# Patient Record
Sex: FEMALE | Race: WHITE | Employment: OTHER | URBAN - METROPOLITAN AREA
[De-identification: names, ages, dates, MRNs, and addresses within clinical notes are randomized per-mention and may not be internally consistent; named-entity substitution may affect disease eponyms.]

---

## 2017-05-07 ENCOUNTER — APPOINTMENT (OUTPATIENT)
Dept: MRI IMAGING | Facility: HOSPITAL | Age: 57
End: 2017-05-07
Attending: EMERGENCY MEDICINE
Payer: COMMERCIAL

## 2017-05-07 ENCOUNTER — HOSPITAL ENCOUNTER (EMERGENCY)
Facility: HOSPITAL | Age: 57
Discharge: HOME | End: 2017-05-07
Attending: EMERGENCY MEDICINE
Payer: COMMERCIAL

## 2017-05-07 ENCOUNTER — APPOINTMENT (OUTPATIENT)
Dept: CT IMAGING | Facility: HOSPITAL | Age: 57
End: 2017-05-07
Attending: EMERGENCY MEDICINE
Payer: COMMERCIAL

## 2017-05-07 VITALS
TEMPERATURE: 98 F | SYSTOLIC BLOOD PRESSURE: 133 MMHG | HEART RATE: 64 BPM | OXYGEN SATURATION: 98 % | WEIGHT: 120 LBS | RESPIRATION RATE: 16 BRPM | HEIGHT: 64 IN | BODY MASS INDEX: 20.49 KG/M2 | DIASTOLIC BLOOD PRESSURE: 68 MMHG

## 2017-05-07 DIAGNOSIS — A87.9 VIRAL MENINGITIS: ICD-10-CM

## 2017-05-07 DIAGNOSIS — G44.209 TENSION HEADACHE: ICD-10-CM

## 2017-05-07 DIAGNOSIS — I67.1 ANEURYSM OF RIGHT INTERNAL CAROTID ARTERY: Primary | ICD-10-CM

## 2017-05-07 PROCEDURE — 96361 HYDRATE IV INFUSION ADD-ON: CPT

## 2017-05-07 PROCEDURE — 89050 BODY FLUID CELL COUNT: CPT | Performed by: EMERGENCY MEDICINE

## 2017-05-07 PROCEDURE — 80048 BASIC METABOLIC PNL TOTAL CA: CPT | Performed by: EMERGENCY MEDICINE

## 2017-05-07 PROCEDURE — 96376 TX/PRO/DX INJ SAME DRUG ADON: CPT

## 2017-05-07 PROCEDURE — 99285 EMERGENCY DEPT VISIT HI MDM: CPT

## 2017-05-07 PROCEDURE — 70553 MRI BRAIN STEM W/O & W/DYE: CPT | Performed by: EMERGENCY MEDICINE

## 2017-05-07 PROCEDURE — 88160 CYTOPATH SMEAR OTHER SOURCE: CPT | Performed by: EMERGENCY MEDICINE

## 2017-05-07 PROCEDURE — 96375 TX/PRO/DX INJ NEW DRUG ADDON: CPT

## 2017-05-07 PROCEDURE — A9575 INJ GADOTERATE MEGLUMI 0.1ML: HCPCS | Performed by: EMERGENCY MEDICINE

## 2017-05-07 PROCEDURE — 96374 THER/PROPH/DIAG INJ IV PUSH: CPT

## 2017-05-07 PROCEDURE — 82945 GLUCOSE OTHER FLUID: CPT | Performed by: EMERGENCY MEDICINE

## 2017-05-07 PROCEDURE — 87205 SMEAR GRAM STAIN: CPT | Performed by: EMERGENCY MEDICINE

## 2017-05-07 PROCEDURE — 85025 COMPLETE CBC W/AUTO DIFF WBC: CPT | Performed by: EMERGENCY MEDICINE

## 2017-05-07 PROCEDURE — 89051 BODY FLUID CELL COUNT: CPT | Performed by: EMERGENCY MEDICINE

## 2017-05-07 PROCEDURE — 84157 ASSAY OF PROTEIN OTHER: CPT | Performed by: EMERGENCY MEDICINE

## 2017-05-07 PROCEDURE — 62270 DX LMBR SPI PNXR: CPT

## 2017-05-07 PROCEDURE — 70450 CT HEAD/BRAIN W/O DYE: CPT | Performed by: EMERGENCY MEDICINE

## 2017-05-07 PROCEDURE — 70544 MR ANGIOGRAPHY HEAD W/O DYE: CPT | Performed by: EMERGENCY MEDICINE

## 2017-05-07 PROCEDURE — 87070 CULTURE OTHR SPECIMN AEROBIC: CPT | Performed by: EMERGENCY MEDICINE

## 2017-05-07 RX ORDER — LORAZEPAM 2 MG/ML
0.5 INJECTION INTRAMUSCULAR ONCE
Status: COMPLETED | OUTPATIENT
Start: 2017-05-07 | End: 2017-05-07

## 2017-05-07 RX ORDER — TRAMADOL HYDROCHLORIDE 50 MG/1
50 TABLET ORAL EVERY 4 HOURS PRN
Qty: 14 TABLET | Refills: 0 | Status: SHIPPED | OUTPATIENT
Start: 2017-05-07 | End: 2017-05-14

## 2017-05-07 RX ORDER — HYDROMORPHONE HYDROCHLORIDE 1 MG/ML
0.5 INJECTION, SOLUTION INTRAMUSCULAR; INTRAVENOUS; SUBCUTANEOUS ONCE
Status: COMPLETED | OUTPATIENT
Start: 2017-05-07 | End: 2017-05-07

## 2017-05-07 NOTE — ED PROVIDER NOTES
Patient Seen in: [facility] Emergency Department    History   Patient presents with:  Headache    Stated Complaint: headache    HPI  Pt c/o a 5/10 headache that began last night. Headache began gradually.   This is more intense when compared to pr Neuro: Oriented x3. PERRL, EOMI. CN II - XII grossly intact. No gross motor deficits. 5/5 strength in all distribution. Sensation fully intact.       DDX to include tension headache vs. Migraine headache vs. Sinusitis vs. [facility]        ED Course     Lab controlled    Lumbar puncture:    After verbal informed consent from patient explaining the risks including infection, bleeding, and neurologic damage, a lumbar puncture was performed after the patient was prepped and draped in the usual fashion.   The back

## 2017-05-07 NOTE — ED INITIAL ASSESSMENT (HPI)
Pt c/o joint pain, headache since yesterday. Chills. Pain to head is generalized that radiates down her neck.  history viral meningitis in the past

## 2017-05-07 NOTE — ED PROVIDER NOTES
Patient Seen in: [facility] Emergency Department    History   Patient presents with:  Headache    Stated Complaint: headache    HPI    Patient signed off to me by Dr. Korey Perez pending CSF results.     Past Medical History   Diagnosis Date   • Nela Salazar Total Protein CSF 68 (*)     All other components within normal limits   CELLCOUNT/DIFF CSF - Abnormal; Notable for the following:     Lymphocytes CSF 95 (*)     Monocytes CSF 5 (*)     All other components within normal limits   CBC W/ DIFFERENTIAL - R-0

## 2017-05-23 ENCOUNTER — OFFICE VISIT (OUTPATIENT)
Dept: NEUROLOGY | Facility: CLINIC | Age: 57
End: 2017-05-23

## 2017-05-23 ENCOUNTER — TELEPHONE (OUTPATIENT)
Dept: NEUROLOGY | Facility: CLINIC | Age: 57
End: 2017-05-23

## 2017-05-23 VITALS
HEIGHT: 64 IN | RESPIRATION RATE: 18 BRPM | SYSTOLIC BLOOD PRESSURE: 122 MMHG | DIASTOLIC BLOOD PRESSURE: 76 MMHG | BODY MASS INDEX: 20.83 KG/M2 | HEART RATE: 76 BPM | WEIGHT: 122 LBS | OXYGEN SATURATION: 95 %

## 2017-05-23 DIAGNOSIS — A87.9 VIRAL MENINGITIS: ICD-10-CM

## 2017-05-23 DIAGNOSIS — I67.1 CEREBRAL ANEURYSM: Primary | ICD-10-CM

## 2017-05-23 PROBLEM — R51.9 HEADACHE: Status: ACTIVE | Noted: 2017-05-23

## 2017-05-23 PROCEDURE — 99204 OFFICE O/P NEW MOD 45 MIN: CPT | Performed by: OTHER

## 2017-05-23 NOTE — TELEPHONE ENCOUNTER
Spoke to [facility]. States will run extra tests ordered now. Unsure if quantity of sample is enough until attempt to run tests. Test 3 [id number] and [id number]. Order placed.

## 2017-05-23 NOTE — TELEPHONE ENCOUNTER
Can we please contact lab and see if we can add additional tests to CSF from 5/7 (would add on VZV, HSV)

## 2017-05-23 NOTE — PROGRESS NOTES
Neurology Outpatient Initial Note    Ronna Downey : 1960   HPI:     Ronna Downey is a 59year old female who is being seen in neurologic evaluation. Patient is being seen in evaluation for bout of meningitis, possible aneurysm.     On Saturday,  internal carotid artery at the level of the cavernous carotid artery. Recommend correlation with any previous outside studies. Consider followup CT angiography or catheter study   for further assessment if clinically important.   2. Otherwise, no aneurysms nuchal rigidity  Neuro:  Mental Status: alert, speech fluent and appropriate       Cranial Nerves: pupils equal, round, and reactive to light; extraocular movements intact; facial sensation intact V1-3, face symmetric, hearing intact, no dysarthria or dysp

## 2017-05-24 NOTE — TELEPHONE ENCOUNTER
Spoke to [facility]. Specimen has not been frozen and is over 3weeks old. Not able to run new tests ordered.

## 2018-01-06 ENCOUNTER — HOSPITAL ENCOUNTER (OUTPATIENT)
Age: 58
Discharge: HOME | End: 2018-01-06
Attending: EMERGENCY MEDICINE
Payer: COMMERCIAL

## 2018-01-06 VITALS
WEIGHT: 125 LBS | HEART RATE: 62 BPM | SYSTOLIC BLOOD PRESSURE: 153 MMHG | HEIGHT: 64 IN | OXYGEN SATURATION: 98 % | TEMPERATURE: 98 F | RESPIRATION RATE: 16 BRPM | BODY MASS INDEX: 21.34 KG/M2 | DIASTOLIC BLOOD PRESSURE: 85 MMHG

## 2018-01-06 DIAGNOSIS — H00.11 CHALAZION OF RIGHT UPPER EYELID: ICD-10-CM

## 2018-01-06 DIAGNOSIS — I10 HYPERTENSION, UNSPECIFIED TYPE: Primary | ICD-10-CM

## 2018-01-06 PROCEDURE — 99213 OFFICE O/P EST LOW 20 MIN: CPT

## 2018-01-06 PROCEDURE — 99214 OFFICE O/P EST MOD 30 MIN: CPT

## 2018-01-06 RX ORDER — ERYTHROMYCIN 5 MG/G
1 OINTMENT OPHTHALMIC EVERY 6 HOURS
Qty: 1 G | Refills: 0 | Status: SHIPPED | OUTPATIENT
Start: 2018-01-06 | End: 2018-01-13

## 2018-01-06 RX ORDER — METOPROLOL SUCCINATE 50 MG/1
50 TABLET, EXTENDED RELEASE ORAL DAILY
COMMUNITY
End: 2020-11-10

## 2018-01-06 NOTE — ED INITIAL ASSESSMENT (HPI)
Used old make up on tin day. Threw all the make up away and patient bought new makeup. Both eyes are itchy. Right eye is pink this morning. Eye lid is red. Denies any visual changes. Denies any headaches. Denies n/v/d.  Patient believes she has pink e

## 2018-01-06 NOTE — ED PROVIDER NOTES
Patient Seen in: [address]    History   Patient presents with:   Eye Visual Problem (opthalmic)    Stated Complaint: eye problem     HPI    The patient is a 61-year-old female without significant past medical history who display    ED Course as of Jan 06 1008  ------------------------------------------------------------       MDM     Patient is a chalazion of her R upper eyelid.   We will treat with antibiotic ointment and have her follow-up with an eye surgeon in the next

## 2018-05-21 ENCOUNTER — HOSPITAL ENCOUNTER (OUTPATIENT)
Dept: MRI IMAGING | Age: 58
Discharge: HOME | End: 2018-05-21
Attending: INTERNAL MEDICINE
Payer: COMMERCIAL

## 2018-05-21 DIAGNOSIS — I67.1 CEREBRAL ANEURYSM: ICD-10-CM

## 2018-05-21 PROCEDURE — 70544 MR ANGIOGRAPHY HEAD W/O DYE: CPT | Performed by: INTERNAL MEDICINE

## 2019-05-14 ENCOUNTER — HOSPITAL ENCOUNTER (OUTPATIENT)
Dept: BONE DENSITY | Age: 59
Discharge: HOME | End: 2019-05-14
Attending: INTERNAL MEDICINE
Payer: COMMERCIAL

## 2019-05-14 DIAGNOSIS — N95.9 MENOPAUSAL AND POSTMENOPAUSAL DISORDER: ICD-10-CM

## 2019-05-14 PROCEDURE — 77080 DXA BONE DENSITY AXIAL: CPT | Performed by: INTERNAL MEDICINE

## 2019-08-29 ENCOUNTER — HOSPITAL ENCOUNTER (OUTPATIENT)
Dept: ULTRASOUND IMAGING | Facility: HOSPITAL | Age: 59
Discharge: HOME | End: 2019-08-29
Attending: INTERNAL MEDICINE
Payer: COMMERCIAL

## 2019-08-29 ENCOUNTER — HOSPITAL ENCOUNTER (OUTPATIENT)
Dept: MRI IMAGING | Facility: HOSPITAL | Age: 59
Discharge: HOME | End: 2019-08-29
Attending: INTERNAL MEDICINE
Payer: COMMERCIAL

## 2019-08-29 DIAGNOSIS — I67.1 CEREBRAL ANEURYSM: ICD-10-CM

## 2019-08-29 DIAGNOSIS — D21.21 MYXOMA OF THIGH, RIGHT: ICD-10-CM

## 2019-08-29 LAB — CREAT BLD-MCNC: 0.6 MG/DL (ref 0.55–1.02)

## 2019-08-29 PROCEDURE — 82565 ASSAY OF CREATININE: CPT

## 2019-08-29 PROCEDURE — 70546 MR ANGIOGRAPH HEAD W/O&W/DYE: CPT | Performed by: INTERNAL MEDICINE

## 2019-08-29 PROCEDURE — 76882 US LMTD JT/FCL EVL NVASC XTR: CPT | Performed by: INTERNAL MEDICINE

## 2019-08-29 PROCEDURE — A9575 INJ GADOTERATE MEGLUMI 0.1ML: HCPCS | Performed by: RADIOLOGY

## 2019-08-30 ENCOUNTER — TELEPHONE (OUTPATIENT)
Dept: NEUROLOGY | Facility: CLINIC | Age: 59
End: 2019-08-30

## 2019-10-28 ENCOUNTER — HOSPITAL ENCOUNTER (OUTPATIENT)
Age: 59
Discharge: HOME | End: 2019-10-28
Attending: FAMILY MEDICINE
Payer: COMMERCIAL

## 2019-10-28 VITALS
HEIGHT: 64 IN | DIASTOLIC BLOOD PRESSURE: 88 MMHG | OXYGEN SATURATION: 100 % | BODY MASS INDEX: 23.05 KG/M2 | SYSTOLIC BLOOD PRESSURE: 159 MMHG | TEMPERATURE: 99 F | WEIGHT: 135 LBS | HEART RATE: 71 BPM | RESPIRATION RATE: 18 BRPM

## 2019-10-28 DIAGNOSIS — S61.411A LACERATION OF RIGHT HAND, FOREIGN BODY PRESENCE UNSPECIFIED, INITIAL ENCOUNTER: Primary | ICD-10-CM

## 2019-10-28 PROCEDURE — 12002 RPR S/N/AX/GEN/TRNK2.6-7.5CM: CPT

## 2019-10-28 PROCEDURE — 90686 IIV4 VACC NO PRSV 0.5 ML IM: CPT

## 2019-10-28 PROCEDURE — 99212 OFFICE O/P EST SF 10 MIN: CPT

## 2019-10-28 PROCEDURE — 90471 IMMUNIZATION ADMIN: CPT

## 2019-10-28 PROCEDURE — 99213 OFFICE O/P EST LOW 20 MIN: CPT

## 2019-10-28 NOTE — ED PROVIDER NOTES
Patient Seen in: [address]      History   Patient presents with:  Laceration    Stated Complaint: right hand laceration    HPI    Pt is a 63 yo with a right hand laceration that occurred prior to coming in.  Patient scra Mental Status: She is alert and oriented to person, place, and time.    Psychiatric:         Mood and Affect: Mood normal.         Behavior: Behavior normal.               ED Course   Labs Reviewed - No data to display           Wound was irrigated with no

## 2019-10-28 NOTE — ED INITIAL ASSESSMENT (HPI)
States sustained lac to R dorsal hand with  at 930am today. Approximate 4.5cm lac noted. No active bleeding noted. Positive CMS. Positive radial pulse. Awake/alert. Breathing easy and even without distress. Speech clear.  Skin warm, dry and

## 2020-07-17 ENCOUNTER — HOSPITAL ENCOUNTER (EMERGENCY)
Facility: HOSPITAL | Age: 60
Discharge: HOME | End: 2020-07-17
Attending: EMERGENCY MEDICINE
Payer: COMMERCIAL

## 2020-07-17 VITALS
SYSTOLIC BLOOD PRESSURE: 171 MMHG | BODY MASS INDEX: 23.05 KG/M2 | TEMPERATURE: 97 F | HEIGHT: 64 IN | WEIGHT: 135 LBS | DIASTOLIC BLOOD PRESSURE: 95 MMHG | HEART RATE: 62 BPM | OXYGEN SATURATION: 100 % | RESPIRATION RATE: 16 BRPM

## 2020-07-17 DIAGNOSIS — Z20.822 EXPOSURE TO COVID-19 VIRUS: Primary | ICD-10-CM

## 2020-07-17 LAB — SARS-COV-2 RNA RESP QL NAA+PROBE: NOT DETECTED

## 2020-07-17 PROCEDURE — 99283 EMERGENCY DEPT VISIT LOW MDM: CPT

## 2020-07-17 NOTE — ED PROVIDER NOTES
Patient Seen in: [facility] Emergency Department      History   Patient presents with:  Testing    Stated Complaint: Covid testing    HPI    The patient is a 69-year-old female who presents for cover testing.   Her son tested positive yesterday and normal              Disposition and Plan     Clinical Impression:  Exposure to COVID-19 virus  (primary encounter diagnosis)    Disposition:  Discharge  7/17/2020  8:33 am    Follow-up:  Viki Zhang  [address] [address] Road  [phone]

## 2021-02-03 PROCEDURE — 88305 TISSUE EXAM BY PATHOLOGIST: CPT | Performed by: INTERNAL MEDICINE

## 2021-08-09 ENCOUNTER — OFFICE VISIT (OUTPATIENT)
Dept: FAMILY MEDICINE CLINIC | Facility: CLINIC | Age: 61
End: 2021-08-09
Payer: COMMERCIAL

## 2021-08-09 VITALS
TEMPERATURE: 98 F | BODY MASS INDEX: 22.53 KG/M2 | OXYGEN SATURATION: 100 % | WEIGHT: 132 LBS | RESPIRATION RATE: 15 BRPM | SYSTOLIC BLOOD PRESSURE: 142 MMHG | HEIGHT: 64 IN | HEART RATE: 71 BPM | DIASTOLIC BLOOD PRESSURE: 88 MMHG

## 2021-08-09 DIAGNOSIS — J06.9 VIRAL URI WITH COUGH: Primary | ICD-10-CM

## 2021-08-09 PROCEDURE — 3079F DIAST BP 80-89 MM HG: CPT | Performed by: NURSE PRACTITIONER

## 2021-08-09 PROCEDURE — 3008F BODY MASS INDEX DOCD: CPT | Performed by: NURSE PRACTITIONER

## 2021-08-09 PROCEDURE — 99213 OFFICE O/P EST LOW 20 MIN: CPT | Performed by: NURSE PRACTITIONER

## 2021-08-09 PROCEDURE — 3077F SYST BP >= 140 MM HG: CPT | Performed by: NURSE PRACTITIONER

## 2021-08-09 NOTE — PATIENT INSTRUCTIONS
••••••••Based on the clinical information, you are being considered for a COVID-19 monoclonal antibody treatment called REGEN-COV (casirivimab with imdevimab), which is an antibody product designed specifically targeted towards the SARS CoV2 virus.  The norman you will need to call the [facility] AS SOON AS POSSIBLE at [phone] during opening hours (M-F 8a-8p, Sa-Sun 8a-4p) to speak with the triage nurse to review eligibility and schedule the treatment session.      Once approved, you will be off called a coronavirus. People can get COVID-19 through contact with another person who has the virus. COVID-19 illnesses have ranged from very mild (including some with no reported symptoms) to severe, including illness resulting in death.  While informat serious illnesses  Are taking any medications (prescription, over-the-counter, vitamins, and herbal products)    HOW WILL I RECEIVE REGEN-COV (casirivimab with imdevimab)?   • REGEN-COV consists of two investigational medicines, casirivimab and imdevimab, g may happen. REGEN-COV is still being studied so it is possible that all of the risks are not known at this time. It is possible that REGEN-COV could interfere with your body's own ability to fight off a future infection of SARS-CoV-2.  Similarly, REGEN-C USE AUTHORIZATION (EUA)? The [place] FDA has made REGEN-COV (casirivimab with imdevimab) available under an emergency access mechanism called an EUA.  The EUA is supported by a [place] of Health and Human Service (HHS) declaration that circumstance prevent the spread of COVID-19. This is called \"social distancing. \"  · Stay away from work, school, and public places. Limit physical contact with family members. Limit visitors. Don't kiss anyone or share eating or drinking utensils.  Clean surfaces you you need to go to a hospital or clinic, expect that the healthcare staff will wear protective equipment such as masks, gowns, gloves, and eye protection. You may be advised to wait in or enter through a separate area.  This is to prevent the possible virus helping your body while it fights the virus. This is known as supportive care. For serious COVID-19, you may need to stay in the hospital. Supportive care includes:   · Getting rest. This helps your body fight the illness. · Staying hydrated.   Drinking li for a chronic condition and need to have oxygen flow increased because of COVID-19  If you've had confirmed COVID-19, your healthcare team may ask you to consider donating your plasma. This is called COVID-19 convalescent plasma donation.  Plasma from levi provider if you have any questions. If you develop symptoms, stay home.  If you had COVID-19 over 3 months ago and have been exposed again, treat it like you've never had COVID-19 and stay home, limit your contact with others, call your provider, and [facility] COVID-19. · Generally, the CDC advises people ages 3 and older who are not vaccinated to wear masks in public places and when around people who don't live in their household.   · CDC's guidance for when to wear a mask is a bit different for fully vaccinate remember them. Date last modified: 4/30/2021  Martha last reviewed this educational content on 4/1/2020  © 4013-0437 [employer]. All rights reserved. This information is not intended as a substitute for professional medical care.  Always f

## 2021-08-09 NOTE — PROGRESS NOTES
CHIEF COMPLAINT:   No chief complaint on file.       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 The treatment works by infusion of synthesized antibodies that bind to the SARS CoV2 virus, and prevents the virus from entering your cells to replicate.  The treatment has been shown in preliminary studies to significa treatment session. Once approved, you will be offered available appointment times as soon as possible. A referral nor an appointment guarantees treatment.  Your condition will be assessed first by a nurse or provider during your visit to determine i including illness resulting in death. While information so far suggests that most  COVID-19 illness is mild, serious illness can occur and may cause some of your other medical conditions to become worse.  People of all ages with severe, long-lasting (chroni investigational medicines, casirivimab and imdevimab, given together as a single intravenous infusion (through a vein). • You will receive one dose of REGEN-COV by intravenous infusion. The infusion will take 20 to 52 minutes or longer.  Your healthcare pr off a future infection of SARS-CoV-2. Similarly, REGEN-COV may reduce your body's immune response to a vaccine for SARS-CoV-2. Specific studies have not been conducted to address these possible risks.  Talk to your healthcare provider if you have any questi Health and Human Service [facility]) declaration that circumstances exist to justify the emergency use of drugs and biological products during the COVID-19 pandemic. REGEN-COV has not undergone the same type of review as an FDA-approved or cleared product.  Mimi Nunez share eating or drinking utensils. Clean surfaces you touch with disinfectant. This is to help prevent the virus from spreading. · If you need to cough or sneeze, do it into a tissue. Then throw the tissue into the trash.  If you don't have tissues, cough a separate area. This is to prevent the possible virus from spreading. · Wear a face mask with 2 or more layers. Use either a cloth mask with layers of tightly woven, breathable fabric or a disposable paper mask with a cloth mask on top.  This is to protec fight the illness. · Staying hydrated. Drinking liquids is the best way to prevent dehydration. Try to drink 6 to 8 glasses of liquids every day, or as advised by your provider. Also check with your provider about which fluids are best for you.  Don't dri COVID-19 convalescent plasma donation. Plasma from people fully recovered from COVID-19 may contain antibodies to help fight COVID-19 in people who are currently seriously ill with the disease.  Experts don't know the safety of COVID-19 convalescent plasma your contact with others, call your provider, and monitor for symptoms. If you are normally healthy, the CDC does not advise retesting for COVID-19 with nose-throat swabs. You can stop self-isolation when all 3 of these are true:   1.  You have had no fev when to wear a mask is a bit different for fully vaccinated people. Fully vaccinated means 2 weeks after getting either the 1-dose or the second shot of the 2-dose vaccine. They:  ? Don't need to wear a mask outdoors except in crowded settings.  For example a substitute for professional medical care. Always follow your healthcare professional's instructions.

## 2021-08-12 LAB — SARS-COV-2 RNA RESP QL NAA+PROBE: DETECTED

## 2021-11-16 PROBLEM — R51.9 HEADACHE: Status: RESOLVED | Noted: 2017-05-23 | Resolved: 2021-11-16

## 2021-11-16 PROBLEM — I10 ESSENTIAL HYPERTENSION: Status: ACTIVE | Noted: 2021-11-16

## 2023-03-31 ENCOUNTER — HOSPITAL ENCOUNTER (EMERGENCY)
Facility: HOSPITAL | Age: 63
Discharge: HOME | End: 2023-03-31
Attending: STUDENT IN AN ORGANIZED HEALTH CARE EDUCATION/TRAINING PROGRAM
Payer: MEDICARE

## 2023-03-31 ENCOUNTER — APPOINTMENT (OUTPATIENT)
Dept: MRI IMAGING | Facility: HOSPITAL | Age: 63
End: 2023-03-31
Attending: STUDENT IN AN ORGANIZED HEALTH CARE EDUCATION/TRAINING PROGRAM
Payer: MEDICARE

## 2023-03-31 ENCOUNTER — APPOINTMENT (OUTPATIENT)
Dept: CT IMAGING | Facility: HOSPITAL | Age: 63
End: 2023-03-31
Attending: STUDENT IN AN ORGANIZED HEALTH CARE EDUCATION/TRAINING PROGRAM
Payer: MEDICARE

## 2023-03-31 VITALS
WEIGHT: 117 LBS | HEART RATE: 51 BPM | RESPIRATION RATE: 15 BRPM | OXYGEN SATURATION: 99 % | DIASTOLIC BLOOD PRESSURE: 76 MMHG | SYSTOLIC BLOOD PRESSURE: 133 MMHG | BODY MASS INDEX: 19.97 KG/M2 | TEMPERATURE: 98 F | HEIGHT: 64 IN

## 2023-03-31 DIAGNOSIS — R42 DIZZINESS: Primary | ICD-10-CM

## 2023-03-31 LAB
ANION GAP SERPL CALC-SCNC: 6 MMOL/L (ref 0–18)
BASOPHILS # BLD AUTO: 0.02 X10(3) UL (ref 0–0.2)
BASOPHILS NFR BLD AUTO: 0.4 %
BUN BLD-MCNC: 17 MG/DL (ref 7–18)
BUN/CREAT SERPL: 27.4 (ref 10–20)
CALCIUM BLD-MCNC: 9.2 MG/DL (ref 8.5–10.1)
CHLORIDE SERPL-SCNC: 106 MMOL/L (ref 98–112)
CO2 SERPL-SCNC: 30 MMOL/L (ref 21–32)
CREAT BLD-MCNC: 0.62 MG/DL
DEPRECATED RDW RBC AUTO: 45.5 FL (ref 35.1–46.3)
EOSINOPHIL # BLD AUTO: 0.07 X10(3) UL (ref 0–0.7)
EOSINOPHIL NFR BLD AUTO: 1.4 %
ERYTHROCYTE [DISTWIDTH] IN BLOOD BY AUTOMATED COUNT: 12.8 % (ref 11–15)
GFR SERPLBLD BASED ON 1.73 SQ M-ARVRAT: 99 ML/MIN/1.73M2 (ref 60–?)
GLUCOSE BLD-MCNC: 98 MG/DL (ref 70–99)
HCT VFR BLD AUTO: 35.9 %
HGB BLD-MCNC: 12.1 G/DL
IMM GRANULOCYTES # BLD AUTO: 0 X10(3) UL (ref 0–1)
IMM GRANULOCYTES NFR BLD: 0 %
LYMPHOCYTES # BLD AUTO: 1.97 X10(3) UL (ref 1–4)
LYMPHOCYTES NFR BLD AUTO: 38.7 %
MCH RBC QN AUTO: 32.5 PG (ref 26–34)
MCHC RBC AUTO-ENTMCNC: 33.7 G/DL (ref 31–37)
MCV RBC AUTO: 96.5 FL
MONOCYTES # BLD AUTO: 0.37 X10(3) UL (ref 0.1–1)
MONOCYTES NFR BLD AUTO: 7.3 %
NEUTROPHILS # BLD AUTO: 2.66 X10 (3) UL (ref 1.5–7.7)
NEUTROPHILS # BLD AUTO: 2.66 X10(3) UL (ref 1.5–7.7)
NEUTROPHILS NFR BLD AUTO: 52.2 %
OSMOLALITY SERPL CALC.SUM OF ELEC: 296 MOSM/KG (ref 275–295)
PLATELET # BLD AUTO: 228 10(3)UL (ref 150–450)
POTASSIUM SERPL-SCNC: 3.6 MMOL/L (ref 3.5–5.1)
RBC # BLD AUTO: 3.72 X10(6)UL
SODIUM SERPL-SCNC: 142 MMOL/L (ref 136–145)
TROPONIN I HIGH SENSITIVITY: 4 NG/L
WBC # BLD AUTO: 5.1 X10(3) UL (ref 4–11)

## 2023-03-31 PROCEDURE — 70551 MRI BRAIN STEM W/O DYE: CPT | Performed by: STUDENT IN AN ORGANIZED HEALTH CARE EDUCATION/TRAINING PROGRAM

## 2023-03-31 PROCEDURE — 99284 EMERGENCY DEPT VISIT MOD MDM: CPT

## 2023-03-31 PROCEDURE — 93005 ELECTROCARDIOGRAM TRACING: CPT

## 2023-03-31 PROCEDURE — 93010 ELECTROCARDIOGRAM REPORT: CPT

## 2023-03-31 PROCEDURE — 85025 COMPLETE CBC W/AUTO DIFF WBC: CPT | Performed by: STUDENT IN AN ORGANIZED HEALTH CARE EDUCATION/TRAINING PROGRAM

## 2023-03-31 PROCEDURE — 80048 BASIC METABOLIC PNL TOTAL CA: CPT | Performed by: STUDENT IN AN ORGANIZED HEALTH CARE EDUCATION/TRAINING PROGRAM

## 2023-03-31 PROCEDURE — 84484 ASSAY OF TROPONIN QUANT: CPT | Performed by: STUDENT IN AN ORGANIZED HEALTH CARE EDUCATION/TRAINING PROGRAM

## 2023-03-31 PROCEDURE — 36415 COLL VENOUS BLD VENIPUNCTURE: CPT

## 2023-03-31 NOTE — ED INITIAL ASSESSMENT (HPI)
Syncope 5 nights ago. She states she was drinking alcohol and getting her hair done. She was bent over a sink having her hair washed, when she stood upright she became dizzy and fell to the floor. Disorientation lasting 5-15 seconds. C/o headache since event. Hx of brain aneurysm ~ 10 years ago. On amlodipine.

## 2023-04-01 LAB
ATRIAL RATE: 59 BPM
P AXIS: 71 DEGREES
P-R INTERVAL: 146 MS
Q-T INTERVAL: 416 MS
QRS DURATION: 80 MS
QTC CALCULATION (BEZET): 411 MS
R AXIS: 60 DEGREES
T AXIS: 54 DEGREES
VENTRICULAR RATE: 59 BPM

## 2023-06-27 ENCOUNTER — ORDER TRANSCRIPTION (OUTPATIENT)
Dept: ADMINISTRATIVE | Facility: HOSPITAL | Age: 63
End: 2023-06-27

## 2023-06-27 DIAGNOSIS — Z13.6 SCREENING FOR ISCHEMIC HEART DISEASE: Primary | ICD-10-CM

## 2023-07-16 ENCOUNTER — HOSPITAL ENCOUNTER (OUTPATIENT)
Dept: CT IMAGING | Age: 63
Discharge: HOME | End: 2023-07-16
Attending: INTERNAL MEDICINE

## 2023-07-16 DIAGNOSIS — Z13.6 SCREENING FOR ISCHEMIC HEART DISEASE: ICD-10-CM

## 2023-07-27 ENCOUNTER — TELEPHONE (OUTPATIENT)
Dept: NEUROSURGERY | Age: 63
End: 2023-07-27

## 2023-08-05 ENCOUNTER — APPOINTMENT (OUTPATIENT)
Dept: CT IMAGING | Facility: HOSPITAL | Age: 63
End: 2023-08-05
Attending: EMERGENCY MEDICINE
Payer: MEDICARE

## 2023-08-05 ENCOUNTER — HOSPITAL ENCOUNTER (EMERGENCY)
Facility: HOSPITAL | Age: 63
Discharge: HOME | End: 2023-08-05
Attending: EMERGENCY MEDICINE
Payer: MEDICARE

## 2023-08-05 VITALS
SYSTOLIC BLOOD PRESSURE: 116 MMHG | TEMPERATURE: 98 F | RESPIRATION RATE: 13 BRPM | DIASTOLIC BLOOD PRESSURE: 70 MMHG | OXYGEN SATURATION: 98 % | WEIGHT: 115 LBS | BODY MASS INDEX: 20 KG/M2 | HEART RATE: 51 BPM

## 2023-08-05 DIAGNOSIS — R51.9 NONINTRACTABLE EPISODIC HEADACHE, UNSPECIFIED HEADACHE TYPE: Primary | ICD-10-CM

## 2023-08-05 LAB
ANION GAP SERPL CALC-SCNC: 5 MMOL/L (ref 0–18)
BASOPHILS # BLD AUTO: 0.04 X10(3) UL (ref 0–0.2)
BASOPHILS NFR BLD AUTO: 0.7 %
BUN BLD-MCNC: 15 MG/DL (ref 7–18)
BUN/CREAT SERPL: 18.1 (ref 10–20)
CALCIUM BLD-MCNC: 9.4 MG/DL (ref 8.5–10.1)
CHLORIDE SERPL-SCNC: 105 MMOL/L (ref 98–112)
CK SERPL-CCNC: 61 U/L
CO2 SERPL-SCNC: 30 MMOL/L (ref 21–32)
CREAT BLD-MCNC: 0.83 MG/DL
DEPRECATED RDW RBC AUTO: 46.1 FL (ref 35.1–46.3)
EGFRCR SERPLBLD CKD-EPI 2021: 78 ML/MIN/1.73M2 (ref 60–?)
EOSINOPHIL # BLD AUTO: 0.05 X10(3) UL (ref 0–0.7)
EOSINOPHIL NFR BLD AUTO: 0.9 %
ERYTHROCYTE [DISTWIDTH] IN BLOOD BY AUTOMATED COUNT: 12.8 % (ref 11–15)
GLUCOSE BLD-MCNC: 125 MG/DL (ref 70–99)
HCT VFR BLD AUTO: 42.3 %
HGB BLD-MCNC: 13.8 G/DL
IMM GRANULOCYTES # BLD AUTO: 0.01 X10(3) UL (ref 0–1)
IMM GRANULOCYTES NFR BLD: 0.2 %
LYMPHOCYTES # BLD AUTO: 1.89 X10(3) UL (ref 1–4)
LYMPHOCYTES NFR BLD AUTO: 33.6 %
MAGNESIUM SERPL-MCNC: 2.3 MG/DL (ref 1.6–2.6)
MCH RBC QN AUTO: 31.4 PG (ref 26–34)
MCHC RBC AUTO-ENTMCNC: 32.6 G/DL (ref 31–37)
MCV RBC AUTO: 96.4 FL
MONOCYTES # BLD AUTO: 0.35 X10(3) UL (ref 0.1–1)
MONOCYTES NFR BLD AUTO: 6.2 %
NEUTROPHILS # BLD AUTO: 3.29 X10 (3) UL (ref 1.5–7.7)
NEUTROPHILS # BLD AUTO: 3.29 X10(3) UL (ref 1.5–7.7)
NEUTROPHILS NFR BLD AUTO: 58.4 %
OSMOLALITY SERPL CALC.SUM OF ELEC: 292 MOSM/KG (ref 275–295)
PLATELET # BLD AUTO: 260 10(3)UL (ref 150–450)
POTASSIUM SERPL-SCNC: 3.5 MMOL/L (ref 3.5–5.1)
RBC # BLD AUTO: 4.39 X10(6)UL
SODIUM SERPL-SCNC: 140 MMOL/L (ref 136–145)
WBC # BLD AUTO: 5.6 X10(3) UL (ref 4–11)

## 2023-08-05 PROCEDURE — 99285 EMERGENCY DEPT VISIT HI MDM: CPT

## 2023-08-05 PROCEDURE — 99284 EMERGENCY DEPT VISIT MOD MDM: CPT

## 2023-08-05 PROCEDURE — 96361 HYDRATE IV INFUSION ADD-ON: CPT

## 2023-08-05 PROCEDURE — 80048 BASIC METABOLIC PNL TOTAL CA: CPT | Performed by: EMERGENCY MEDICINE

## 2023-08-05 PROCEDURE — 96374 THER/PROPH/DIAG INJ IV PUSH: CPT

## 2023-08-05 PROCEDURE — 83735 ASSAY OF MAGNESIUM: CPT | Performed by: EMERGENCY MEDICINE

## 2023-08-05 PROCEDURE — 82550 ASSAY OF CK (CPK): CPT | Performed by: EMERGENCY MEDICINE

## 2023-08-05 PROCEDURE — 70450 CT HEAD/BRAIN W/O DYE: CPT | Performed by: EMERGENCY MEDICINE

## 2023-08-05 PROCEDURE — 96375 TX/PRO/DX INJ NEW DRUG ADDON: CPT

## 2023-08-05 PROCEDURE — 85025 COMPLETE CBC W/AUTO DIFF WBC: CPT | Performed by: EMERGENCY MEDICINE

## 2023-08-05 RX ORDER — CLOPIDOGREL BISULFATE 75 MG/1
75 TABLET ORAL DAILY
COMMUNITY
Start: 2023-07-26 | End: 2023-08-18

## 2023-08-05 RX ORDER — ROSUVASTATIN CALCIUM 5 MG/1
5 TABLET, COATED ORAL NIGHTLY
COMMUNITY
Start: 2023-07-25

## 2023-08-05 RX ORDER — METOCLOPRAMIDE HYDROCHLORIDE 5 MG/ML
5 INJECTION INTRAMUSCULAR; INTRAVENOUS ONCE
Status: COMPLETED | OUTPATIENT
Start: 2023-08-05 | End: 2023-08-05

## 2023-08-05 RX ORDER — BUTALBITAL, ACETAMINOPHEN AND CAFFEINE 50; 325; 40 MG/1; MG/1; MG/1
1 TABLET ORAL EVERY 6 HOURS PRN
Qty: 10 TABLET | Refills: 0 | Status: SHIPPED | OUTPATIENT
Start: 2023-08-05 | End: 2023-08-18

## 2023-08-05 RX ORDER — DIPHENHYDRAMINE HYDROCHLORIDE 50 MG/ML
12.5 INJECTION INTRAMUSCULAR; INTRAVENOUS ONCE
Status: COMPLETED | OUTPATIENT
Start: 2023-08-05 | End: 2023-08-05

## 2023-08-05 NOTE — ED INITIAL ASSESSMENT (HPI)
Patient presents to the ED c/o headache x 10 days and \"jittery feeling\" in bilateral legs starting today. Pt states symptoms begun after starting rosuvastatin and clopidogrel. Denies any injuries and falls. No facial droop noted. Bilateral  strength strong. A&ox4.

## 2023-08-07 ENCOUNTER — OFFICE VISIT (OUTPATIENT)
Dept: SURGERY | Facility: CLINIC | Age: 63
End: 2023-08-07
Payer: MEDICARE

## 2023-08-07 VITALS
HEIGHT: 64 IN | DIASTOLIC BLOOD PRESSURE: 68 MMHG | SYSTOLIC BLOOD PRESSURE: 120 MMHG | HEART RATE: 65 BPM | WEIGHT: 115 LBS | BODY MASS INDEX: 19.63 KG/M2

## 2023-08-07 DIAGNOSIS — I67.1 BRAIN ANEURYSM: Primary | ICD-10-CM

## 2023-08-07 PROCEDURE — 99205 OFFICE O/P NEW HI 60 MIN: CPT | Performed by: NEUROLOGICAL SURGERY

## 2023-08-08 ENCOUNTER — TELEPHONE (OUTPATIENT)
Dept: SURGERY | Facility: CLINIC | Age: 63
End: 2023-08-08

## 2023-08-08 NOTE — TELEPHONE ENCOUNTER
Pt brought in imaging disc to be uploaded into PACS; MRA Brain 7/14/23, and CTA Brain + CTA Carotids Contrast only 7/25/23; Imaging successfully uploaded into PACS; Disc placed in pt  drawer.

## 2023-08-09 ENCOUNTER — TELEPHONE (OUTPATIENT)
Dept: NEUROSURGERY | Age: 63
End: 2023-08-09

## 2023-08-10 ENCOUNTER — PATIENT MESSAGE (OUTPATIENT)
Dept: SURGERY | Facility: CLINIC | Age: 63
End: 2023-08-10

## 2023-08-10 NOTE — TELEPHONE ENCOUNTER
LOV 8.7.23 w/ Dr. Papito Castro:  \"Plan:  Will make further recommendation when imaging is able to be reviewed\"      Routed to provider as Esvin Friedman

## 2023-08-11 NOTE — TELEPHONE ENCOUNTER
Noted the patient message listed below. Noted patient is requesting the physician to review imaging and provide feedback.

## 2023-08-11 NOTE — TELEPHONE ENCOUNTER
Pt calling to inquire regarding imaging she would like to know if Dr. Nelly Johnson has had a chance to review imaging. She stated she would like a call back to discuss imaging.

## 2023-08-18 ENCOUNTER — OFFICE VISIT (OUTPATIENT)
Dept: SURGERY | Facility: CLINIC | Age: 63
End: 2023-08-18
Payer: MEDICARE

## 2023-08-18 VITALS
HEART RATE: 73 BPM | WEIGHT: 115 LBS | BODY MASS INDEX: 19.63 KG/M2 | SYSTOLIC BLOOD PRESSURE: 136 MMHG | HEIGHT: 64 IN | DIASTOLIC BLOOD PRESSURE: 64 MMHG

## 2023-08-18 DIAGNOSIS — I67.1 BRAIN ANEURYSM: Primary | ICD-10-CM

## 2023-08-18 DIAGNOSIS — I65.02 STENOSIS OF LEFT VERTEBRAL ARTERY: ICD-10-CM

## 2023-08-18 PROCEDURE — 99215 OFFICE O/P EST HI 40 MIN: CPT | Performed by: NEUROLOGICAL SURGERY

## 2023-09-18 ENCOUNTER — APPOINTMENT (OUTPATIENT)
Dept: NEUROSURGERY | Age: 63
End: 2023-09-18

## 2023-12-01 ENCOUNTER — HOSPITAL ENCOUNTER (INPATIENT)
Facility: HOSPITAL | Age: 63
LOS: 1 days | Discharge: HOME | End: 2023-12-02
Attending: EMERGENCY MEDICINE | Admitting: INTERNAL MEDICINE
Payer: MEDICARE

## 2023-12-01 ENCOUNTER — APPOINTMENT (OUTPATIENT)
Dept: CT IMAGING | Facility: HOSPITAL | Age: 63
End: 2023-12-01
Attending: EMERGENCY MEDICINE
Payer: MEDICARE

## 2023-12-01 ENCOUNTER — APPOINTMENT (OUTPATIENT)
Dept: CV DIAGNOSTICS | Facility: HOSPITAL | Age: 63
End: 2023-12-01
Attending: Other
Payer: MEDICARE

## 2023-12-01 ENCOUNTER — APPOINTMENT (OUTPATIENT)
Dept: MRI IMAGING | Facility: HOSPITAL | Age: 63
End: 2023-12-01
Attending: Other
Payer: MEDICARE

## 2023-12-01 ENCOUNTER — APPOINTMENT (OUTPATIENT)
Dept: GENERAL RADIOLOGY | Facility: HOSPITAL | Age: 63
End: 2023-12-01
Attending: EMERGENCY MEDICINE
Payer: MEDICARE

## 2023-12-01 DIAGNOSIS — R27.0 ATAXIA: Primary | ICD-10-CM

## 2023-12-01 PROBLEM — I63.9 CEREBROVASCULAR ACCIDENT (CVA) (HCC): Status: ACTIVE | Noted: 2023-12-01

## 2023-12-01 LAB
ANION GAP SERPL CALC-SCNC: 5 MMOL/L (ref 0–18)
APTT PPP: 31.2 SECONDS (ref 23.3–35.6)
ATRIAL RATE: 65 BPM
BASOPHILS # BLD AUTO: 0.03 X10(3) UL (ref 0–0.2)
BASOPHILS NFR BLD AUTO: 1 %
BUN BLD-MCNC: 21 MG/DL (ref 9–23)
BUN/CREAT SERPL: 25.3 (ref 10–20)
CALCIUM BLD-MCNC: 8.9 MG/DL (ref 8.7–10.4)
CHLORIDE SERPL-SCNC: 110 MMOL/L (ref 98–112)
CHOLEST SERPL-MCNC: 161 MG/DL (ref ?–200)
CO2 SERPL-SCNC: 28 MMOL/L (ref 21–32)
CREAT BLD-MCNC: 0.83 MG/DL
DEPRECATED RDW RBC AUTO: 48.9 FL (ref 35.1–46.3)
EGFRCR SERPLBLD CKD-EPI 2021: 78 ML/MIN/1.73M2 (ref 60–?)
EOSINOPHIL # BLD AUTO: 0.11 X10(3) UL (ref 0–0.7)
EOSINOPHIL NFR BLD AUTO: 3.7 %
ERYTHROCYTE [DISTWIDTH] IN BLOOD BY AUTOMATED COUNT: 13.2 % (ref 11–15)
EST. AVERAGE GLUCOSE BLD GHB EST-MCNC: 100 MG/DL (ref 68–126)
GLUCOSE BLD-MCNC: 95 MG/DL (ref 70–99)
GLUCOSE BLDC GLUCOMTR-MCNC: 106 MG/DL (ref 70–99)
HBA1C MFR BLD: 5.1 % (ref ?–5.7)
HCT VFR BLD AUTO: 37.2 %
HDLC SERPL-MCNC: 66 MG/DL (ref 40–59)
HGB BLD-MCNC: 11.8 G/DL
IMM GRANULOCYTES # BLD AUTO: 0.01 X10(3) UL (ref 0–1)
IMM GRANULOCYTES NFR BLD: 0.3 %
INR BLD: 1.1 (ref 0.8–1.2)
LDLC SERPL CALC-MCNC: 80 MG/DL (ref ?–100)
LYMPHOCYTES # BLD AUTO: 1.4 X10(3) UL (ref 1–4)
LYMPHOCYTES NFR BLD AUTO: 46.5 %
MCH RBC QN AUTO: 31.5 PG (ref 26–34)
MCHC RBC AUTO-ENTMCNC: 31.7 G/DL (ref 31–37)
MCV RBC AUTO: 99.2 FL
MONOCYTES # BLD AUTO: 0.34 X10(3) UL (ref 0.1–1)
MONOCYTES NFR BLD AUTO: 11.3 %
NEUTROPHILS # BLD AUTO: 1.12 X10 (3) UL (ref 1.5–7.7)
NEUTROPHILS # BLD AUTO: 1.12 X10(3) UL (ref 1.5–7.7)
NEUTROPHILS NFR BLD AUTO: 37.2 %
NONHDLC SERPL-MCNC: 95 MG/DL (ref ?–130)
OSMOLALITY SERPL CALC.SUM OF ELEC: 299 MOSM/KG (ref 275–295)
P AXIS: 69 DEGREES
P-R INTERVAL: 172 MS
PLATELET # BLD AUTO: 216 10(3)UL (ref 150–450)
POTASSIUM SERPL-SCNC: 4.3 MMOL/L (ref 3.5–5.1)
PROTHROMBIN TIME: 14.9 SECONDS (ref 11.6–14.8)
Q-T INTERVAL: 410 MS
QRS DURATION: 68 MS
QTC CALCULATION (BEZET): 426 MS
R AXIS: 60 DEGREES
RBC # BLD AUTO: 3.75 X10(6)UL
SODIUM SERPL-SCNC: 143 MMOL/L (ref 136–145)
T AXIS: 52 DEGREES
TRIGL SERPL-MCNC: 82 MG/DL (ref 30–149)
VENTRICULAR RATE: 65 BPM
VLDLC SERPL CALC-MCNC: 13 MG/DL (ref 0–30)
WBC # BLD AUTO: 3 X10(3) UL (ref 4–11)

## 2023-12-01 PROCEDURE — 70450 CT HEAD/BRAIN W/O DYE: CPT | Performed by: EMERGENCY MEDICINE

## 2023-12-01 PROCEDURE — 70498 CT ANGIOGRAPHY NECK: CPT | Performed by: EMERGENCY MEDICINE

## 2023-12-01 PROCEDURE — 93306 TTE W/DOPPLER COMPLETE: CPT | Performed by: OTHER

## 2023-12-01 PROCEDURE — 70551 MRI BRAIN STEM W/O DYE: CPT | Performed by: OTHER

## 2023-12-01 PROCEDURE — 99223 1ST HOSP IP/OBS HIGH 75: CPT | Performed by: OTHER

## 2023-12-01 PROCEDURE — 70496 CT ANGIOGRAPHY HEAD: CPT | Performed by: EMERGENCY MEDICINE

## 2023-12-01 PROCEDURE — 71045 X-RAY EXAM CHEST 1 VIEW: CPT | Performed by: EMERGENCY MEDICINE

## 2023-12-01 RX ORDER — ONDANSETRON 2 MG/ML
4 INJECTION INTRAMUSCULAR; INTRAVENOUS EVERY 6 HOURS PRN
Status: DISCONTINUED | OUTPATIENT
Start: 2023-12-01 | End: 2023-12-02

## 2023-12-01 RX ORDER — LABETALOL HYDROCHLORIDE 5 MG/ML
10 INJECTION, SOLUTION INTRAVENOUS EVERY 10 MIN PRN
Status: DISCONTINUED | OUTPATIENT
Start: 2023-12-01 | End: 2023-12-02

## 2023-12-01 RX ORDER — SODIUM CHLORIDE 9 MG/ML
INJECTION, SOLUTION INTRAVENOUS CONTINUOUS
Status: DISCONTINUED | OUTPATIENT
Start: 2023-12-01 | End: 2023-12-02

## 2023-12-01 RX ORDER — ATORVASTATIN CALCIUM 40 MG/1
40 TABLET, FILM COATED ORAL NIGHTLY
Status: DISCONTINUED | OUTPATIENT
Start: 2023-12-01 | End: 2023-12-02

## 2023-12-01 RX ORDER — ACETAMINOPHEN 650 MG/1
650 SUPPOSITORY RECTAL EVERY 4 HOURS PRN
Status: DISCONTINUED | OUTPATIENT
Start: 2023-12-01 | End: 2023-12-02

## 2023-12-01 RX ORDER — HEPARIN SODIUM 5000 [USP'U]/ML
5000 INJECTION, SOLUTION INTRAVENOUS; SUBCUTANEOUS EVERY 12 HOURS SCHEDULED
Status: DISCONTINUED | OUTPATIENT
Start: 2023-12-01 | End: 2023-12-02

## 2023-12-01 RX ORDER — ACETAMINOPHEN 325 MG/1
650 TABLET ORAL EVERY 4 HOURS PRN
Status: DISCONTINUED | OUTPATIENT
Start: 2023-12-01 | End: 2023-12-02

## 2023-12-01 RX ORDER — CLOPIDOGREL BISULFATE 75 MG/1
75 TABLET ORAL DAILY
Status: DISCONTINUED | OUTPATIENT
Start: 2023-12-02 | End: 2023-12-02

## 2023-12-01 RX ORDER — HYDRALAZINE HYDROCHLORIDE 20 MG/ML
10 INJECTION INTRAMUSCULAR; INTRAVENOUS EVERY 2 HOUR PRN
Status: DISCONTINUED | OUTPATIENT
Start: 2023-12-01 | End: 2023-12-02

## 2023-12-01 RX ORDER — ASPIRIN 81 MG/1
81 TABLET, CHEWABLE ORAL DAILY
Status: DISCONTINUED | OUTPATIENT
Start: 2023-12-01 | End: 2023-12-02

## 2023-12-01 RX ORDER — METOCLOPRAMIDE HYDROCHLORIDE 5 MG/ML
10 INJECTION INTRAMUSCULAR; INTRAVENOUS EVERY 8 HOURS PRN
Status: DISCONTINUED | OUTPATIENT
Start: 2023-12-01 | End: 2023-12-02

## 2023-12-01 RX ORDER — CLOPIDOGREL BISULFATE 75 MG/1
300 TABLET ORAL ONCE
Status: COMPLETED | OUTPATIENT
Start: 2023-12-01 | End: 2023-12-01

## 2023-12-01 NOTE — CM/SW NOTE
12/01/23 1400   CM/SW Referral Data   Referral Source Physician   Reason for Referral Discharge planning   Informant Patient;Spouse/Significant Other   Medical Hx   Does patient have an established PCP? Yes  Kaelyn Lewis MD)   Patient Info   Advanced directives? Yes   Patient's Current Mental Status at Time of Assessment Alert;Oriented   Patient's [address]   Number of Levels in Home 2   Patient lives with Spouse/Significant other   Patient Status Prior to Admission   Independent with ADLs and Mobility Yes   Discharge Needs   Anticipated D/C needs Home health care; No anticipated discharge needs   Choice of Post-Acute Provider   Informed patient of right to choose their preferred provider Yes   Information given to Patient     SW received MDO order for home health evaluation. SW spoke to pt and spouse Berenice Denson at bedside. Pt was admitted 7:00 AM for Ataxia. Pt fully independent with ADLs prior to admission. Pt denies using HHC previously but is aware of what [facility] entails. Pt wishing to return to work at \[employer]\"". SW told pt she will follow her clinical course for DC needs. PLAN: DC needs pending clinical course    / to remain available for support and/or discharge planning.      Preeti Metcalf MSW, [facility]

## 2023-12-01 NOTE — ED QUICK NOTES
Orders for admission, patient is aware of plan and ready to go upstairs. Any questions, please call ED RN Delia at extension [phone].      Patient Covid vaccination status: Fully vaccinated     COVID Test Ordered in ED: None    COVID Suspicion at Admission: N/A    Running Infusions:  None    Mental Status/LOC at time of transport: A&Ox4    Other pertinent information:   CIWA score: N/A   NIH score:  0

## 2023-12-01 NOTE — ED INITIAL ASSESSMENT (HPI)
EMS states that the Pt C/O dizziness and Lt sided weakness since 0530. Has Hx of Vertebral artery occlusion. No C/O N/V or headache.

## 2023-12-01 NOTE — SLP NOTE
ADULT SWALLOWING EVALUATION    ASSESSMENT    ASSESSMENT/OVERALL IMPRESSION:  PPE REQUIRED. THIS THERAPIST WORE GLOVES AND MASK FOR DURATION OF EVALUATION. HANDS WASHED UPON ENTRANCE/EXIT. SLP BSSE orders received and acknowledged. A swallow evaluation warranted per stroke protocol. Pt afebrile with clear vocal quality, on room air, with oxygen saturation at 96%. Pt with no hx of dysphagia at [address] [place] [address]. Pt positioned 90 degrees in bed, alert/cooperative. Pt with no complaints of pain. Oral motor skills within functional limits. Pt presented with trials of hard solids and thin liquids via straw. Pt with good oral acceptance and bilabial seal across all trials. Pt with intact bite, mastication of solids, and timely A-P transit. Pt's swallow response appears timely with adequate hyolaryngeal elevation/excursion. No clinical signs of aspiration (e.g., immediate/delayed throat clear, immediate/delayed cough, wet vocal quality, increased O2 effort) observed across all trials. 12/1 CXR indicates \"Normal examination\". Oxygen status remained stable t/o the entire evaluation. At this time, pt presents with functional oral and probable pharyngeal swallow stages. Recommend a regular diet and thin liquids with strict adherence to safe swallowing compensatory strategies. Results and recommendations reviewed with RN, pt. Pt v/u to all results/recommendations. Recommendations remain written on whiteboard. SLP collaborated with RN for MD diet orders. No further swallow services warranted at this time. Please re consult if needed. RECOMMENDATIONS   Diet Recommendations - Solids: Regular  Diet Recommendations - Liquids:  Thin Liquids                              Medication Administration Recommendations: No restrictions  Treatment Plan/Recommendations: Communication evaluation (pending MRI results)  Discharge Recommendations/Plan: Undetermined    HISTORY   MEDICAL HISTORY  Reason for Referral: Stroke protocol    Problem List  Principal Problem:    Ataxia  Active Problems:    Cerebrovascular accident (CVA) [facility])      Past Medical History  Past Medical History:   Diagnosis Date    Breast cyst, left     previous aspiration and imaging with benign cyst    Cerebral aneurysm     COVID     Essential hypertension     Hyperlipidemia      2019    Meningitis     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 SUBJECTIVE       OBJECTIVE   ORAL MOTOR EXAMINATION  Dentition: Functional  Symmetry: Within Functional Limits  Strength: Within Functional Limits  Tone: Within Functional Limits  Range of Motion: Within Functional Limits  Rate of Motion: Within Functional Limits    Voice Quality: Clear  Respiratory Status: Unlabored  Consistencies Trialed: Thin liquids; Hard solid  Method of Presentation: Self presentation;Straw  Patient Positioning: Upright;Midline    Oral Phase of Swallow: Within Functional Limits                      Pharyngeal Phase of Swallow: Within Functional Limits           (Please note: Silent aspiration cannot be evaluated clinically.  Videofluoroscopic Swallow Study is required to rule-out silent aspiration.)    Esophageal Phase of Swallow: No complaints consistent with possible esophageal involvement  Comments: NA              GOALS: NA    FOLLOW UP  Treatment Plan/Recommendations: Communication evaluation (pending MRI results)  Number of Visits to Meet Established Goals: 1  Follow Up Needed (Documentation Required): Yes  SLP Follow-up Date: 12/04/23    Thank you for your referral.   If you have any questions, please contact Hadley Goldmann, JASPREET Terrazas M.S. [id number] [facility]  Speech Language Pathologist  Phone Number [phone]

## 2023-12-02 VITALS
BODY MASS INDEX: 21.11 KG/M2 | WEIGHT: 123.63 LBS | OXYGEN SATURATION: 99 % | TEMPERATURE: 98 F | HEART RATE: 89 BPM | SYSTOLIC BLOOD PRESSURE: 167 MMHG | HEIGHT: 64 IN | RESPIRATION RATE: 16 BRPM | DIASTOLIC BLOOD PRESSURE: 76 MMHG

## 2023-12-02 LAB
GLUCOSE BLDC GLUCOMTR-MCNC: 100 MG/DL (ref 70–99)
GLUCOSE BLDC GLUCOMTR-MCNC: 116 MG/DL (ref 70–99)

## 2023-12-02 NOTE — PLAN OF CARE
No acute changes overnight. MRI resulted. Q4 neuro assessments. Plan is for home when medically clear. Problem: Patient Centered Care  Goal: Patient preferences are identified and integrated in the patient's plan of care  Description: Interventions:  - What would you like us to know as we care for you? I live with my   - Provide timely, complete, and accurate information to patient/family  - Incorporate patient and family knowledge, values, beliefs, and cultural backgrounds into the planning and delivery of care  - Encourage patient/family to participate in care and decision-making at the level they choose  - Honor patient and family perspectives and choices  Outcome: Progressing     Problem: NEUROLOGICAL - ADULT  Goal: Achieves stable or improved neurological status  Description: INTERVENTIONS  - Assess for and report changes in neurological status  - Initiate measures to prevent increased intracranial pressure  - Maintain blood pressure and fluid volume within ordered parameters to optimize cerebral perfusion and minimize risk of hemorrhage  - Monitor temperature, glucose, and sodium.  Initiate appropriate interventions as ordered  Outcome: Progressing  Goal: Achieves maximal functionality and self care  Description: INTERVENTIONS  - Monitor swallowing and airway patency with patient fatigue and changes in neurological status  - Encourage and assist patient to increase activity and self care with guidance from PT/OT  - Encourage visually impaired, hearing impaired and aphasic patients to use assistive/communication devices  Outcome: Progressing

## 2023-12-02 NOTE — CONSULTS
Cardiology (consult dictated)    Assessment:  1. Patient presents with symptoms suggesting a TIA or stroke. Has known vertebral artery stenosis. Brain MRI negative for stroke. As part of workup echo done which was interpreted as showing mild decline in LV systolic function, EF 45 to 50%. No wall motion abnormalities. No arrhythmias on the monitor. My review of the echo shows borderline low systolic function with an EF of about 50 to 55%. Measured ejection fraction on the echo done by the technician was 60%. Patient with no prior cardiac history. Plan:  1. Patient may be discharged home from a cardiac standpoint. 2.  Agree with aspirin and Plavix. 3.  Consider outpatient MCT    4. Would recommend cardiac MRI as a more precise way of measuring ejection fraction. This can be done as an outpatient. Thank you.

## 2023-12-02 NOTE — CONSULTS
[facility]    PATIENT'S NAME: WILDA العلي CHYNA   ATTENDING PHYSICIAN: Anne Marie Ruiz. Mac Webb MD   CONSULTING PHYSICIAN: Jennifer Denny. Berenice Alcantar MD   PATIENT ACCOUNT#:   [id number]    LOCATION:  [address] [place], [place] [address]Rupesh Velarde #:   [id number]       YOB: 1960  ADMISSION DATE:       2023      CONSULT DATE:  2023    REPORT OF CONSULTATION      HISTORY OF PRESENT ILLNESS:  The patient is a 25-year-old female who jumped out of bed yesterday morning and felt ataxic and wobbly. She feels also that there was a near-syncopal component to it, although there was no loss of consciousness. She had a similar episode in March, which occurred while getting her hair done and requiring her head and neck to be turned in different directions during that procedure. There was no loss of consciousness in March. She is known to have vertebral artery stenosis. There is no history of other cardiac disease. Part of the workup for the current presentation included an echocardiogram.  The echo reported mild global left ventricular dysfunction with an EF of 45% to 50%. Further review of the records and history indicates that the patient does have small cerebral artery aneurysms. These are being monitored and there has been no intervention. She had a coronary calcium score of 221 after an ultrafast CT in July of this year. PAST MEDICAL HISTORY:  Positive only for hypertension. PAST SURGICAL HISTORY:   and a tonsillectomy. MEDICATIONS:  At home include amlodipine 2.5 b.i.d., aspirin 81 q.a.m., rosuvastatin 5 mg daily. ALLERGIES:  No known drug allergies. SOCIAL HISTORY:  She is  with children. FAMILY HISTORY:  Positive for coronary disease in both parents. PHYSICAL EXAMINATION:    GENERAL:  Well-developed, well-nourished female in no acute distress. Alert and oriented x3.   VITAL SIGNS:  Blood pressure has been normal, but this afternoon it was elevated at 167/76; respirations 16; pulse 68 and regular; afebrile. HEENT:  Unremarkable. NECK:  Supple. Jugular venous pressure normal.  Carotids brisk without bruits. No thyromegaly. LUNGS:  Clear. HEART:  S1, S2 are normal.  There is no gallop, murmur, rub, or click. ABDOMEN:  Benign. EXTREMITIES:  Warm and dry. Good pulses. No edema. LABORATORY DATA:  Labs are remarkable for a white count of 3.0, hemoglobin 11.8, platelets 575. Her LDL cholesterol is 80 and non-HDL is 95. Echo is interpreted as showing an ejection fraction of 45% to 50%. My own reading of the echo was perhaps a bit higher, that is 50% to 55%. The measured ejection fraction was 60%. No other significant findings. EKG shows sinus rhythm with a rate of 65 and is otherwise normal.    CTs were unremarkable except for a small aneurysm; no occlusive disease. ASSESSMENT:    1. Symptoms suggesting possible stroke or transient ischemic attack. Await neurology followup regarding this issue. 2.   Echo interpretation showing mild left ventricular dysfunction, although this is subjective and other assessments have not confirmed that. PLAN:    1. If this is considered to be a stroke or TIA, the patient should have a 30-day MCT after discharge. 2.   Agree with aspirin, Plavix, and statin for now. 3.   Would recommend cardiac MRI as an outpatient for a more precise way of measuring LV ejection fraction. If, in fact, the EF is low, the information from the MRI may give information regarding etiology. Thank you for this consultation. Dictated By Magdy Ordonez.  Nano Alexander MD  d: 12/02/2023 14:52:14  t: 12/02/2023 15:21:37  Viviana King [id number]  [facility] [place]/

## 2023-12-02 NOTE — PHYSICAL THERAPY NOTE
PHYSICAL THERAPY EVALUATION - INPATIENT     Room Number: 789/287-M  Evaluation Date: 12/2/2023  Type of Evaluation: Initial   Physician Order: PT Eval and Treat    Presenting Problem: ataxia     Reason for Therapy: Mobility Dysfunction and Discharge Planning    PHYSICAL THERAPY ASSESSMENT     Patient is a 70year old female admitted 12/1/2023 for ataxia. Patient's current functional deficits include dizziness however pt demonstrating all functional mobility at supervision to mod I level and safe to return home with intermittent supervision and no further skilled therapy services needed. RN approved participation with physical therapy. Pt was received resting in bed and agreeable to activity. Educated on role of PT and PT plan of care, goals for this session. Pt verbalized understanding. Pt reported feeling slightly dizzy but denied all other symptoms including pain, vision changes, weakness, SOB, and impaired sensation. Bed mobility: independent supine<>sit and scooting to EOB  Transfers: independent for STS without assistive device   Gait: ambulated 436 ft without assistive device, no significant gait deviations and no unsteady gait or LOB. Pt slightly impulsive and given cues to slow down pace and navigate environment safely. Per discussion with pt, no further questions or concerns about safe mobility and DC home, feels almost back to baseline. BP slightly elevated at 167/76 after walking; RN was made aware. Pt was left resting in bed with needs within reach, handoff to RN complete. The patient's Approx Degree of Impairment: 28.97% has been calculated based on documentation in the [facility] '6 clicks' Inpatient Basic Mobility Short Form. Research supports that patients with this level of impairment may benefit from home with no services. DISCHARGE RECOMMENDATIONS  PT Discharge Recommendations: Home; Intermittent Supervision    PLAN    Patient has been evaluated and presents with no skilled Physical Therapy needs at this time. Patient will be discharged from Physical Therapy services. Please re-order if a new functional limitation presents during this admission. PHYSICAL THERAPY MEDICAL/SOCIAL HISTORY     Problem List  Principal Problem:    Ataxia  Active Problems:    Cerebrovascular accident (CVA) ([address]      HOME SITUATION  Home Situation  Type of Home: House  Home Layout: Two level;Bed/bath upstairs  Stairs to Bedroom: 13  Railing: Yes  Lives With: Spouse; Family  Drives: Yes  Patient Owned Equipment: None  Patient Regularly Uses: None     Prior Level of [place]: independent with ADLs and mobility without assistive device     SUBJECTIVE  Agreeable to activity. PHYSICAL THERAPY EXAMINATION     OBJECTIVE  Precautions: None  Fall Risk: Standard fall risk    WEIGHT BEARING RESTRICTION  none    PAIN ASSESSMENT  Ratin    COGNITION  Overall Cognitive Status:  WFL - within functional limits    RANGE OF MOTION AND STRENGTH ASSESSMENT  Upper extremity ROM and strength are within functional limits. Lower extremity ROM is within functional limits. Lower extremity strength is within functional limits. BALANCE  Static Sitting: Good  Dynamic Sitting: Fair +  Static Standing: Fair +  Dynamic Standing: Fair    ACTIVITY TOLERANCE  Pulse: 89  Heart Rate Source: Monitor  BP: (!) 167/76  BP Location: Right arm  BP Method: Automatic  Patient Position: Sitting    AM-PAC '6-Clicks' INPATIENT SHORT FORM - BASIC MOBILITY  How much difficulty does the patient currently have. .. Patient Difficulty: Turning over in bed (including adjusting bedclothes, sheets and blankets)?: None   Patient Difficulty: Sitting down on and standing up from a chair with arms (e.g., wheelchair, bedside commode, etc.): None   Patient Difficulty: Moving from lying on back to sitting on the side of the bed?: None   How much help from another person does the patient currently need. ..    Help from Another: Moving to and from a bed to a chair (including a wheelchair)?: A Little   Help from Another: Need to walk in hospital room?: A Little   Help from Another: Climbing 3-5 steps with a railing?: A Little     AM-PAC Score:  Raw Score: 21   Approx Degree of Impairment: 28.97%   Standardized Score (AM-PAC Scale): 50.25   CMS Modifier (G-Code): CJ    FUNCTIONAL ABILITY STATUS  Functional Mobility/Gait Assessment  Gait Assistance: Supervision  Distance (ft): 436  Assistive Device: None  Pattern: Within Functional Limits (impulsive)    Bed Mobility: independent     Transfers: independent    Exercise/Education Provided:  Energy conservation  Functional activity tolerated  Gait training  Posture    Patient End of Session: Call light within reach; Needs met; In bed;RN aware of session/findings; All patient questions and concerns addressed    Patient was able to achieve the following . ..    Patient able to transfer  Safely and independently    Patient able to ambulate on level surfaces   Safely and with supervision        Patient Evaluation Complexity Level:  History Low - no personal factors and/or co-morbidities   Examination of body systems Low - addressing 1-2 elements   Clinical Presentation Low - Stable   Clinical Decision Making Low Complexity       Therapeutic Activity: 10 minutes

## 2023-12-02 NOTE — PLAN OF CARE
Patient is less dizzy. Echo was done. MRI pending. Neuro checks done. Call light at bedside and safety measures in place. Problem: Patient Centered Care  Goal: Patient preferences are identified and integrated in the patient's plan of care  Description: Interventions:  - What would you like us to know as we care for you? From home with   - Provide timely, complete, and accurate information to patient/family  - Incorporate patient and family knowledge, values, beliefs, and cultural backgrounds into the planning and delivery of care  - Encourage patient/family to participate in care and decision-making at the level they choose  - Honor patient and family perspectives and choices  Outcome: Progressing     Problem: Patient/Family Goals  Goal: Patient/Family Long Term Goal  Description: Patient's Long Term Goal: Better care for self    Interventions:  - See additional Care Plan goals for specific interventions  Outcome: Progressing     Problem: Patient/Family Goals  Goal: Patient/Family Short Term Goal  Description: Patient's Short Term Goal: Go home    Interventions:   - See additional Care Plan goals for specific interventions  Outcome: Progressing     Problem: NEUROLOGICAL - ADULT  Goal: Achieves stable or improved neurological status  Description: INTERVENTIONS  - Assess for and report changes in neurological status  - Initiate measures to prevent increased intracranial pressure  - Maintain blood pressure and fluid volume within ordered parameters to optimize cerebral perfusion and minimize risk of hemorrhage  - Monitor temperature, glucose, and sodium.  Initiate appropriate interventions as ordered  Outcome: Progressing

## 2023-12-03 NOTE — DISCHARGE SUMMARY
General Medicine Discharge Summary     Patient ID:  Joel Tan  70year old  1/22/1960    Admit date: 12/1/2023    Discharge date and time: 12/02/23    Attending Physician: No att. providers found     Primary Care Physician: Rubén Levy MD     Reason for admission: fall    Discharge Diagnoses: Ataxia [R27.0]    Discharged Condition: stable    Disposition: home    Consults:   Consultants         Provider   Role Specialty     Joseluis Shelley MD  Consulting Physician Ericka Ramirez MD  Consulting Physician Interventional, Cardiology              HPI: Joel Tan - 70year old female presenting with above CC. History obtained from patient w/ additional history from  at bedside & review of outside records in care everywhere. Eliazar Phillips a few mos ago, w/u noted vertebral artery stenosis. She's had multiple images & has seen several neurologists/neurosurgeons/vascular surgeons all recommending med mgmt. HTN - on amlodipine 5mg/d  HLD - on crestor 5  GROVER - was on plavix 75, both cards & neuro did not feel this was necessary so was switched to asa 81mg alone and has been taking. She woke up this morning feeling off. She noted that she was leaning to the left side. No HA, no blurry vision, no specific numbness or weakness. Notes that EMS noted slurred speech. In ER, feeling better but still a little dizzy.  adds that she takes 2mg xanax &2-3 tylenol PM every night to help her sleep. In addition, she had a bottle of wine last night. States that the wine is not a nightly occurrence. In ED, afebrile, vss. CBC w wbc 3, hgb 11.8. EKG sinus. CXR w/o acute process. CTH w/o acute process. Neurology consulted, d/w Dr. Bridgett Hunter, EM - will admit for stroke eval.    Hospital Course:       Mayito Gutierrez - 70year old female w L vert artery stenosis admitted 12/1/2023 for stroke eval. MRI negative for stroke.  TTE with mildly reduced EF 45-50%, cardiology consulted, thought to have normal EF, recommended outpatient eval. +orthostatic BP, states she did have ETOH the night prior and benzodiazepines. Recommended decreased ETOH intake and wearing compression socks. F/u PCP and cardiology     AMS - c/f CVA/TIA  L vertebral artery stenosis - known dx, stable  R carotid cave aneurysm - known dx, stable, plan for repeat CTA ~7/2024  HTN  HLD  - Suspect sx related to 2mg xanax, 2 tylenol PM, & bottle of wine she had last night. Given her history though, prudent to r/o stroke. - F/w [facility]: Dr. Corrine Mott OP. 3 endovascular neurosurgery opinions concur on maximal medical therapy and repeat CTA head/neck in 1 year   - PTA reg: ASA 81, crestor 5, amlodipine 5mg  - CTH w/o acute process  [  ] RxFx eval: A1c, FLP, tele  [  ] MRI brain  [  ] TTE  - Neuro consult  - Plavix load in ED. ASA 81, plvaix, statin  - PT/OT/SLP  - Continue PTA          Exam  GEN: NAD  HEENT: EOMI,  Pulm: CTAB, no crackles or wheezes  CV: RRR, no murmurs,   ABD: Soft, non-tender, non-distended, +BS  Neuro: Grossly normal, CN intact, sensory intact  SKIN: warm, dry  EXT: no edema    Operative Procedures:      Imaging: MRI BRAIN (CPT=70551)    Result Date: 12/1/2023  CONCLUSION:  No restricted diffusion to suggest acute ischemic stroke. Minimal chronic small vessel ischemic disease. Dictated by (CST): Herminia Pollard MD on 12/01/2023 at 10:24 PM     Finalized by (CST): Herminia Pollard MD on 12/01/2023 at 10:27 PM          XR CHEST AP PORTABLE  (CPT=71045)    Result Date: 12/1/2023  CONCLUSION: Normal examination. Dictated by (CST): Marquita Crowell MD on 12/01/2023 at 8:14 AM     Finalized by (CST): Marquita Crowell MD on 12/01/2023 at 8:15 AM          CT STROKE CTA BRAIN/CTA NECK (W IV)(CPT=70496/46379)    Result Date: 12/1/2023  CONCLUSION:  1. No proximal intracranial flow limiting stenosis/large vessel occlusion.   If symptoms or suspicion for acute ischemia persist, consider followup brain MR. 2. Stable tiny 1.5 mm right carotid cave aneurysm. 3. No hemodynamically significant stenosis or acute dissection involving the cervical carotid or vertebral arteries. Stable chronic short segment occlusion at the ostium of the left vertebral artery. The left vertebral artery is diminutive in caliber and demonstrates multifocal additional mild-to-moderate stenoses, all of which are unchanged since prior outside institution CT arteriography from July, 2023. Findings likely relate to sequelae of multifocal chronic left vertebral artery atherosclerotic disease (or less likely chronic left vertebral artery dissection) mild non flow-limiting atherosclerotic disease at the right carotid bifurcation. 4. Stable moderate non flow-limiting soft plaque at the proximal left subclavian artery, which does not result in hemodynamically significant stenosis. 5. Biapical pulmonary scarring and paraseptal emphysema. elm-remote  Dictated by (CST): Olga Sandoval MD on 12/01/2023 at 7:26 AM     Finalized by (CST): Olga Sandoval MD on 12/01/2023 at 7:38 AM          CT STROKE BRAIN (NO IV)(CPT=70450)    Result Date: 12/1/2023  CONCLUSION:   No evidence of acute intracranial hemorrhage or extended signs of acute large vessel infarction. This report was called immediately at 0723 hours to the emergency room and discussed with Dr. Renetta Davies.    elm-remote  Dictated by (CST): Olga Sandoval MD on 12/01/2023 at 7:21 AM     Finalized by (CST): Olga Sandoval MD on 12/01/2023 at 7:25 AM               Home Medication Changes:     I reconciled current and discharge medications on day of discharge.  These medication changes have been made as below         Medication List        CHANGE how you take these medications      amLODIPine 5 MG Tabs  Commonly known as: Norvasc  TAKE 1 TABLET(5 MG) BY MOUTH DAILY  What changed: how much to take            CONTINUE taking these medications      Aspirin 81 MG Caps     rosuvastatin 5 MG Tabs  Commonly known as: Crestor Activity: activity as tolerated  Diet: cardiac diet  Wound Care: none needed  Code Status: Full Code  O2: n/a    Follow-up with:    PCP   Specialist cardiology, neurology       FU   Follow-up Information       Virgie Menezes MD Follow up in 1 month(s). Specialty: NEUROLOGY  Why: Neuro/Stroke follow-up  Contact information:  [address] Junaid Rudolph MD Follow up in 1 week(s). Specialty: Internal Medicine  Contact information:  [address] [place] [address]  [phone]               Reg Castro MD Follow up. Specialty: CARDIOLOGY  Contact information:   [address] [place] [address]  [phone]                             AY instructions:         Follow-up with labs: as below    Total Time Coordinating Care: 31 minutes    Patient had opportunity to ask questions and state understand and agree with therapeutic plan as outlined      Palak Goetz MD  [facility] Hospitalist

## 2023-12-04 ENCOUNTER — PATIENT OUTREACH (OUTPATIENT)
Dept: CASE MANAGEMENT | Age: 63
End: 2023-12-04

## 2023-12-04 NOTE — PROGRESS NOTES
Neuro/Stroke apt request (discharged 12/02)    Dr Eric Zavaleta, [address] [facility] 61 [address] [facility], [address] [place] [address]   [phone]   Apt made:  Tue 02/27/24 10:20am & on waitlist    Dr Dejan Hernandez  [address] [facility] Entr[address]  [facility]  [place], [address]  One of the nurses will be putting in the order for the 'Cardiac MRI' and will call pt to let them know the order is no - will need to call Central Scheduling [phone]  Apt made:  Tue 12/26 @10:00am w/RAS Otero  Confirmed w/pt  Closing encounter

## 2024-01-03 ENCOUNTER — LAB ENCOUNTER (OUTPATIENT)
Dept: LAB | Facility: HOSPITAL | Age: 64
End: 2024-01-03
Attending: INTERNAL MEDICINE
Payer: MEDICARE

## 2024-01-03 ENCOUNTER — HOSPITAL ENCOUNTER (OUTPATIENT)
Dept: MRI IMAGING | Facility: HOSPITAL | Age: 64
Discharge: HOME | End: 2024-01-03
Attending: INTERNAL MEDICINE
Payer: MEDICARE

## 2024-01-03 DIAGNOSIS — I51.9 LV DYSFUNCTION: ICD-10-CM

## 2024-01-03 LAB — HCT VFR BLD AUTO: 40.4 %

## 2024-01-03 PROCEDURE — 75561 CARDIAC MRI FOR MORPH W/DYE: CPT | Performed by: INTERNAL MEDICINE

## 2024-01-03 PROCEDURE — 75557 CARDIAC MRI FOR MORPH: CPT | Performed by: INTERNAL MEDICINE

## 2024-01-03 PROCEDURE — 85014 HEMATOCRIT: CPT | Performed by: RADIOLOGY

## (undated) NOTE — MR AVS SNAPSHOT
[facility]  [address], [address] [place] [address]  [address] [facility] [phone]               Thank you for choosing us for your health care visit with Elvia Lance MD.  We are glad to serve you and happy to provide you with this summary of your Now link in the [facility] [employer] [facility]. Enter your [employer] Activation Code exactly as it appears below along with your Zip Code and Date of Birth to complete the sign-up process. If you do not sign up before the expiration date, you must request a new code.     Kareen Santos

## (undated) NOTE — ED AVS SNAPSHOT
[facility] Emergency Department    [address]     [address] [place] [address]    Phone:  [phone]    Fax:  [phone]           Devon Inman   MRN: [id number]    Department:  [facility] Emergency Department   Date of Visit:  5/7/201 and Class Registration line at [phone] or find a doctor online by visiting [email or web address].    IF THERE IS ANY CHANGE OR WORSENING OF YOUR CONDITION, CALL YOUR PRIMARY CARE PHYSICIAN AT ONCE OR RETURN IMMEDIATELY TO [address] [place] [address].     If

## (undated) NOTE — ED AVS SNAPSHOT
[facility] Emergency Department    [address]     [address] [place] [address]    Phone:  [phone]    Fax:  [phone]           Agustin No   MRN: [id number]    Department:  [facility] Emergency Department   Date of Visit:  5/7/201 Medication List      START taking these medications     TraMADol HCl 50 MG Tabs   Quantity:  14 tablet   Commonly known as:  ULTRAM   Take 1 tablet (50 mg total) by mouth every 4 (four) hours as needed for Pain.             Where to Get Your Medications [facility] Emergency Department. Follow-up care is at the discretion of that Physician.   If you need additional assistance selecting a physician, you may call the [employer] Physician Referral and Class Registration line at [phone] [facility] [address] [address]  ([place] [address]) [phone]                Additional Information       We are concerned for your overall well being:    - If you are a smoker or have smoked in the last 12 months, we encourage you to explore op